# Patient Record
Sex: MALE | Race: WHITE | NOT HISPANIC OR LATINO | URBAN - METROPOLITAN AREA
[De-identification: names, ages, dates, MRNs, and addresses within clinical notes are randomized per-mention and may not be internally consistent; named-entity substitution may affect disease eponyms.]

---

## 2019-03-15 ENCOUNTER — OUTPATIENT (OUTPATIENT)
Dept: OUTPATIENT SERVICES | Facility: HOSPITAL | Age: 49
LOS: 1 days | Discharge: HOME | End: 2019-03-15

## 2019-03-15 LAB
HCT VFR BLD CALC: 44.3 % — SIGNIFICANT CHANGE UP (ref 42–52)
HGB BLD-MCNC: 15.1 G/DL — SIGNIFICANT CHANGE UP (ref 14–18)
MCHC RBC-ENTMCNC: 29.4 PG — SIGNIFICANT CHANGE UP (ref 27–31)
MCHC RBC-ENTMCNC: 34.1 G/DL — SIGNIFICANT CHANGE UP (ref 32–37)
MCV RBC AUTO: 86.4 FL — SIGNIFICANT CHANGE UP (ref 80–94)
NRBC # BLD: 0 /100 WBCS — SIGNIFICANT CHANGE UP (ref 0–0)
PLATELET # BLD AUTO: 204 K/UL — SIGNIFICANT CHANGE UP (ref 130–400)
RBC # BLD: 5.13 M/UL — SIGNIFICANT CHANGE UP (ref 4.7–6.1)
RBC # FLD: 12.9 % — SIGNIFICANT CHANGE UP (ref 11.5–14.5)
WBC # BLD: 8.44 K/UL — SIGNIFICANT CHANGE UP (ref 4.8–10.8)
WBC # FLD AUTO: 8.44 K/UL — SIGNIFICANT CHANGE UP (ref 4.8–10.8)

## 2019-03-15 RX ORDER — CLOPIDOGREL BISULFATE 75 MG/1
1 TABLET, FILM COATED ORAL
Qty: 0 | Refills: 0 | COMMUNITY

## 2019-03-15 RX ORDER — ATORVASTATIN CALCIUM 80 MG/1
1 TABLET, FILM COATED ORAL
Qty: 0 | Refills: 0 | COMMUNITY

## 2019-03-15 RX ORDER — METOPROLOL TARTRATE 50 MG
1 TABLET ORAL
Qty: 0 | Refills: 0 | COMMUNITY

## 2019-03-15 RX ORDER — ASPIRIN/CALCIUM CARB/MAGNESIUM 324 MG
1 TABLET ORAL
Qty: 0 | Refills: 0 | COMMUNITY

## 2019-03-15 NOTE — PROGRESS NOTE ADULT - SUBJECTIVE AND OBJECTIVE BOX
POST OPERATIVE PROCEDURAL DOCUMENTATION  PRE-OP DIAGNOSIS: CAD    POST-OP DIAGNOSIS: non-obstructive CAD    PROCEDURE:   LEFT HEART CATHERIZATION    Physician: Sol FERRARA  Assistant:  Vanessa FERRARA    ANESTHESIA TYPE:  [x ] Sedation  [x ] Local/Regional  [  ]General Anesthesia    ESTIMATED BLOOD LOSS: < 10 ml         CONDITION  [x ] Good    SPECIMENS REMOVED (IF APPLICABLE):  n/a    IMPLANTS (IF APPLICABLE):   none    FINDINGS:    LEFT HEART CATHERIZATION                                    LVEF%: 60%  LVEDP: WNL  Coronary circulation: The coronary circulation is right dominant. There was no angiographic evidence for occlusive coronary artery disease. Left main: Normal. LAD: The vessel was medium sized. Angiography showed slow flow with no evidence of disease. Circumflex: The vessel was medium sized. Proximal circumflex: Angiography showed no evidence of disease. Distal circumflex: The vessel was small to medium sized. Angiography showed moderate atherosclerosis with no clinical lesions appreciated. 1st obtuse marginal: The vessel was small to medium sized. Angiography showed no evidence of disease. 2nd obtuse marginal: The vessel was medium sized. There was a tubular 50 % stenosis at a site with no prior intervention, at the ostium of the vessel segment. There was XAVIER grade 3 flow through the vessel (brisk flow). RCA: The vessel was medium sized. Proximal RCA: Angiography showed minor luminal irregularities with no flow limiting lesions. Mid RCA: There was a tubular 50 % stenosis at a site with no prior intervention. There was XAVIER grade 3 flow through the vessel (brisk flow). Distal RCA: Angiography showed patent prior stent and minor luminal irregularities with no flow limiting lesions. Right PDA: Angiography showed no evidence of disease.     PERCUTANEOUS CORONARY INTERVENTIONS:  none    COMPLICATIONS:  none      POST-OP DIAGNOSIS  [x ] Non-obstructive CAD    PLAN OF CARE    [x ] D/C Home today   [x ]  Continue DAPT, B-blocker & Statin therapy  [x ]  Medical Therapy  [x ] Aggressive risk factor modification. The patient should follow a low fat and low calorie diet. POST OPERATIVE PROCEDURAL DOCUMENTATION  PRE-OP DIAGNOSIS: CAD, CCS 3 angina.  Hx of distal RCA stent.    POST-OP DIAGNOSIS: non-obstructive CAD    PROCEDURE:   LEFT HEART CATHETERIZATION:    Physician: Jerod Horton MD  Assistant:  Vanessa FERRARA    ANESTHESIA TYPE:  [x ] Sedation  [x ] Local/Regional    ESTIMATED BLOOD LOSS: < 10 ml         CONDITION  [x ] Stable    SPECIMENS REMOVED (IF APPLICABLE):  n/a    IMPLANTS (IF APPLICABLE):   none    FINDINGS:    LEFT HEART CATHETERIZATION                                    LVEF%: 60%  LVEDP: WNL  Coronary circulation: The coronary circulation is right dominant. There was no angiographic evidence for occlusive coronary artery disease. Left main: Normal. LAD: The vessel was medium sized. Angiography showed slow flow with no evidence of disease. Circumflex: The vessel was medium sized. Proximal circumflex: Angiography showed no evidence of disease. Distal circumflex: The vessel was small to medium sized. Angiography showed moderate atherosclerosis with no clinical lesions appreciated. 1st obtuse marginal: The vessel was small to medium sized. Angiography showed no evidence of disease. 2nd obtuse marginal: The vessel was medium sized. There was a tubular 50 % stenosis at a site with no prior intervention, at the ostium of the vessel segment. There was XAVIER grade 3 flow through the vessel (brisk flow). RCA: The vessel was medium sized. Proximal RCA: Angiography showed minor luminal irregularities with no flow limiting lesions. Mid RCA: There was a tubular 50 % stenosis at a site with no prior intervention. There was XAVIER grade 3 flow through the vessel (brisk flow). Distal RCA: Angiography showed widely patent prior stent and minor luminal irregularities with no flow limiting lesions. Right PDA: Angiography showed no evidence of disease.     PERCUTANEOUS CORONARY INTERVENTIONS:  none    COMPLICATIONS:  none    POST-OP DIAGNOSIS  [x ] Non-obstructive CAD, Patent distal RCA stent.    iFR of mid RCA and OM2 were >0.98    PLAN OF CARE    [x ] D/C Home today   [x ]  Continue DAPT, B-blocker & Statin therapy  [x ]  Medical Therapy  [x ] Aggressive risk factor modification. The patient should follow a low fat and low calorie diet.

## 2019-03-15 NOTE — H&P CARDIOLOGY - HISTORY OF PRESENT ILLNESS
HPI  48 yrs old male with PMHx of HTN,DLD,CAd s/p PCI to DRCA in 2017 presented for [facility] due to chest pain  REVIEW OF SYSTEMS:  CONSTITUTIONAL: No weakness, fevers or chills  EYES/ENT: No visual changes;  No vertigo or throat pain   NECK: No pain or stiffness  RESPIRATORY: No cough, wheezing, hemoptysis; SEE HPI  CARDIOVASCULAR: SEE HPI  GASTROINTESTINAL: No abdominal or epigastric pain. No nausea, vomiting, or hematemesis; No diarrhea or constipation. No melena or hematochezia.  GENITOURINARY: No dysuria, frequency or hematuria  NEUROLOGICAL: No numbness or weakness  SKIN: No itching, rashes      PHYSICAL EXAM:  T(C): --  HR: 80  BP: 157/87  RR: 18  SpO2: 98% ra  GENERAL: NAD  HEAD:  Atraumatic, Normocephalic  EYES: conjunctiva and sclera clear  NECK: No JVD  CHEST/LUNG: Clear to auscultation bilaterally; No wheeze  HEART: Regular rate and rhythm; No murmurs  ABDOMEN: Soft, Nontender, Nondistended; Bowel sounds present  EXTREMITIES:  2+ Peripheral Pulses, No clubbing, cyanosis, or edema  NEUROLOGY:  A&Ox3, appropriate  SKIN: No rashes or lesions  DELANEY TEST:right wnl HPI  48 yrs old male with PMHx of HTN, DLD, CAd s/p PCI to Distal RCA in 2017 presented for [facility] due to recurrent left chest pain with radiation to left arm.  CCS 3  REVIEW OF SYSTEMS:  CONSTITUTIONAL: No weakness, fevers or chills  EYES/ENT: No visual changes;  No vertigo or throat pain   NECK: No pain or stiffness  RESPIRATORY: No cough, wheezing, hemoptysis; SEE HPI  CARDIOVASCULAR: SEE HPI  GASTROINTESTINAL: No abdominal or epigastric pain. No nausea, vomiting, or hematemesis; No diarrhea or constipation. No melena or hematochezia.  GENITOURINARY: No dysuria, frequency or hematuria  NEUROLOGICAL: No numbness or weakness  SKIN: No itching, rashes      PHYSICAL EXAM:  T(C): --  HR: 80  BP: 157/87  RR: 18  SpO2: 98% ra  GENERAL: NAD  HEAD:  Atraumatic, Normocephalic  EYES: conjunctiva and sclera clear  NECK: No JVD  CHEST/LUNG: Clear to auscultation bilaterally; No wheeze  HEART: Regular rate and rhythm; No murmurs  ABDOMEN: Soft, Nontender, Nondistended; Bowel sounds present  EXTREMITIES:  2+ Peripheral Pulses, No clubbing, cyanosis, or edema  NEUROLOGY:  A&Ox3, appropriate  SKIN: No rashes or lesions  DELANEY TEST: right wnl

## 2019-03-21 DIAGNOSIS — I25.10 ATHEROSCLEROTIC HEART DISEASE OF NATIVE CORONARY ARTERY WITHOUT ANGINA PECTORIS: ICD-10-CM

## 2019-03-21 DIAGNOSIS — E78.5 HYPERLIPIDEMIA, UNSPECIFIED: ICD-10-CM

## 2019-03-21 DIAGNOSIS — I10 ESSENTIAL (PRIMARY) HYPERTENSION: ICD-10-CM
